# Patient Record
Sex: FEMALE | Race: WHITE | NOT HISPANIC OR LATINO | Employment: FULL TIME | URBAN - METROPOLITAN AREA
[De-identification: names, ages, dates, MRNs, and addresses within clinical notes are randomized per-mention and may not be internally consistent; named-entity substitution may affect disease eponyms.]

---

## 2018-11-29 ENCOUNTER — OFFICE VISIT (OUTPATIENT)
Dept: FAMILY MEDICINE CLINIC | Facility: CLINIC | Age: 38
End: 2018-11-29

## 2018-11-29 VITALS
TEMPERATURE: 98.1 F | HEART RATE: 84 BPM | WEIGHT: 144 LBS | HEIGHT: 65 IN | DIASTOLIC BLOOD PRESSURE: 72 MMHG | BODY MASS INDEX: 23.99 KG/M2 | SYSTOLIC BLOOD PRESSURE: 120 MMHG

## 2018-11-29 DIAGNOSIS — J01.00 ACUTE NON-RECURRENT MAXILLARY SINUSITIS: Primary | ICD-10-CM

## 2018-11-29 PROCEDURE — 99213 OFFICE O/P EST LOW 20 MIN: CPT | Performed by: NURSE PRACTITIONER

## 2018-11-29 RX ORDER — METHYLPREDNISOLONE 4 MG/1
TABLET ORAL
Qty: 21 TABLET | Refills: 0 | Status: SHIPPED | OUTPATIENT
Start: 2018-11-29 | End: 2019-11-27

## 2018-11-29 RX ORDER — AMOXICILLIN 875 MG/1
875 TABLET, COATED ORAL 2 TIMES DAILY
Qty: 14 TABLET | Refills: 0 | Status: SHIPPED | OUTPATIENT
Start: 2018-11-29 | End: 2019-11-27

## 2018-11-29 NOTE — PATIENT INSTRUCTIONS

## 2018-11-29 NOTE — PROGRESS NOTES
Darren Barlow is a 38 y.o. female.     History of Present Illness   Sinus congestion and nasal congestion, HA, runny and stuffy nose for the past 2 weeks  Using Afrin NS to get nasal passages open, no fever or chills,   + hx of sinus infections  Yellow nasal secretions  Using saline nasal spray    The following portions of the patient's history were reviewed and updated as appropriate: allergies, current medications, past family history, past medical history, past social history, past surgical history and problem list.    Review of Systems   Constitutional: Negative for activity change, appetite change, chills, fatigue and fever.   HENT: Positive for congestion, postnasal drip, rhinorrhea, sinus pressure and sore throat. Negative for ear pain, sneezing, trouble swallowing and voice change.    Eyes: Negative for redness and itching.   Respiratory: Positive for cough. Negative for chest tightness, shortness of breath and wheezing.    Cardiovascular: Negative.    Gastrointestinal: Negative.  Negative for abdominal pain, nausea and vomiting.   Endocrine: Negative.    Genitourinary: Negative.    Musculoskeletal: Negative.  Negative for arthralgias and myalgias.   Skin: Negative.  Negative for rash.   Allergic/Immunologic: Positive for environmental allergies.   Neurological: Positive for headache. Negative for dizziness, weakness and light-headedness.   Hematological: Negative for adenopathy.   Psychiatric/Behavioral: Negative.  Negative for sleep disturbance.       Objective   Physical Exam   Constitutional: She is oriented to person, place, and time. She appears well-developed and well-nourished. No distress.   HENT:   Head: Normocephalic.   Right Ear: Tympanic membrane, external ear and ear canal normal.   Left Ear: Tympanic membrane, external ear and ear canal normal.   Nose: Mucosal edema, rhinorrhea and sinus tenderness present. Right sinus exhibits maxillary sinus tenderness and frontal sinus tenderness.  Left sinus exhibits maxillary sinus tenderness and frontal sinus tenderness.   Mouth/Throat: Uvula is midline, oropharynx is clear and moist and mucous membranes are normal. No oropharyngeal exudate, posterior oropharyngeal edema, posterior oropharyngeal erythema or tonsillar abscesses.   Eyes: Conjunctivae are normal.   Neck: Neck supple.   Cardiovascular: Normal rate, regular rhythm, S1 normal, S2 normal and normal heart sounds.   Pulmonary/Chest: Effort normal and breath sounds normal. No respiratory distress. She has no decreased breath sounds. She has no wheezes. She has no rhonchi. She has no rales.   Lymphadenopathy:        Head (right side): No tonsillar, no preauricular, no posterior auricular and no occipital adenopathy present.        Head (left side): No tonsillar, no preauricular, no posterior auricular and no occipital adenopathy present.     She has no cervical adenopathy.   Neurological: She is alert and oriented to person, place, and time.   Skin: Skin is warm and dry. She is not diaphoretic.   Psychiatric: She has a normal mood and affect. Her behavior is normal. Judgment and thought content normal.   Nursing note and vitals reviewed.        Assessment/Plan   Hoa was seen today for sinus congestion / pressure.    Diagnoses and all orders for this visit:    Acute non-recurrent maxillary sinusitis  -     MethylPREDNISolone (MEDROL, LUCIANO,) 4 MG tablet; Take as directed on package instructions.  -     amoxicillin (AMOXIL) 875 MG tablet; Take 1 tablet by mouth 2 (Two) Times a Day.    TAke meds as directed   F/U as needed

## 2019-11-27 ENCOUNTER — OFFICE VISIT (OUTPATIENT)
Dept: FAMILY MEDICINE CLINIC | Facility: CLINIC | Age: 39
End: 2019-11-27

## 2019-11-27 VITALS
HEART RATE: 88 BPM | BODY MASS INDEX: 25.46 KG/M2 | WEIGHT: 153 LBS | RESPIRATION RATE: 16 BRPM | DIASTOLIC BLOOD PRESSURE: 76 MMHG | SYSTOLIC BLOOD PRESSURE: 120 MMHG | TEMPERATURE: 97.3 F

## 2019-11-27 DIAGNOSIS — J01.00 ACUTE NON-RECURRENT MAXILLARY SINUSITIS: Primary | ICD-10-CM

## 2019-11-27 PROCEDURE — 99213 OFFICE O/P EST LOW 20 MIN: CPT | Performed by: FAMILY MEDICINE

## 2019-11-27 RX ORDER — AMOXICILLIN 500 MG/1
1000 CAPSULE ORAL 2 TIMES DAILY
Qty: 40 CAPSULE | Refills: 0 | Status: SHIPPED | OUTPATIENT
Start: 2019-11-27 | End: 2020-10-31

## 2019-11-27 RX ORDER — PREDNISONE 20 MG/1
40 TABLET ORAL DAILY
Qty: 10 TABLET | Refills: 0 | Status: SHIPPED | OUTPATIENT
Start: 2019-11-27 | End: 2020-10-31

## 2019-11-27 NOTE — PROGRESS NOTES
Subjective   Hoa Barlow is a 39 y.o. female.     History of Present Illness     Her allergies have been worse and then the last 2 weeks she has been feeling worse  Sinus pressure  Left ear [pain  Teeth sore on the left as well  Severe nasal congestion        Review of Systems   Constitutional: Negative for fever.   HENT: Positive for congestion and sinus pressure.        Objective   Physical Exam   Constitutional: She appears well-developed and well-nourished. No distress.   HENT:   Head: Normocephalic and atraumatic.   Right Ear: Hearing, tympanic membrane, external ear and ear canal normal.   Left Ear: Hearing, tympanic membrane, external ear and ear canal normal.   Nose: Right sinus exhibits maxillary sinus tenderness. Left sinus exhibits maxillary sinus tenderness and frontal sinus tenderness.   Mouth/Throat: Uvula is midline, oropharynx is clear and moist and mucous membranes are normal.   Eyes: Conjunctivae and EOM are normal.   Neck: Normal range of motion.   Cardiovascular: Normal rate, regular rhythm and normal heart sounds.   Pulmonary/Chest: Effort normal and breath sounds normal.   Lymphadenopathy:     She has no cervical adenopathy.   Psychiatric: She has a normal mood and affect. Her behavior is normal.   Nursing note and vitals reviewed.      Assessment/Plan   Hoa was seen today for sinus problem.    Diagnoses and all orders for this visit:    Acute non-recurrent maxillary sinusitis  -     predniSONE (DELTASONE) 20 MG tablet; Take 2 tablets by mouth Daily.  -     amoxicillin (AMOXIL) 500 MG capsule; Take 2 capsules by mouth 2 (Two) Times a Day.  -     Chlorcyclizine-Pseudoephed 25-60 MG tablet; 1/2-1 po q 8 hours PRN    stahist, pred 5 days and amox.  Call back INB

## 2020-10-31 ENCOUNTER — TELEMEDICINE (OUTPATIENT)
Dept: FAMILY MEDICINE CLINIC | Facility: TELEHEALTH | Age: 40
End: 2020-10-31

## 2020-10-31 ENCOUNTER — E-VISIT (OUTPATIENT)
Dept: FAMILY MEDICINE CLINIC | Facility: TELEHEALTH | Age: 40
End: 2020-10-31

## 2020-10-31 DIAGNOSIS — H66.001 ACUTE SUPPURATIVE OTITIS MEDIA OF RIGHT EAR WITHOUT SPONTANEOUS RUPTURE OF TYMPANIC MEMBRANE, RECURRENCE NOT SPECIFIED: ICD-10-CM

## 2020-10-31 DIAGNOSIS — J06.9 UPPER RESPIRATORY TRACT INFECTION, UNSPECIFIED TYPE: Primary | ICD-10-CM

## 2020-10-31 PROCEDURE — 99213 OFFICE O/P EST LOW 20 MIN: CPT | Performed by: NURSE PRACTITIONER

## 2020-10-31 RX ORDER — AZELASTINE HCL 205.5 UG/1
SPRAY NASAL
COMMUNITY
Start: 2015-12-16 | End: 2022-04-22

## 2020-10-31 RX ORDER — AMOXICILLIN 875 MG/1
875 TABLET, COATED ORAL 2 TIMES DAILY
Qty: 20 TABLET | Refills: 0 | Status: SHIPPED | OUTPATIENT
Start: 2020-10-31 | End: 2020-11-10

## 2020-10-31 RX ORDER — FLUTICASONE PROPIONATE 50 MCG
2 SPRAY, SUSPENSION (ML) NASAL DAILY
Qty: 1 BOTTLE | Refills: 0 | Status: SHIPPED | OUTPATIENT
Start: 2020-10-31

## 2020-10-31 RX ORDER — PREDNISONE 10 MG/1
TABLET ORAL
Qty: 21 TABLET | Refills: 0 | Status: SHIPPED | OUTPATIENT
Start: 2020-10-31 | End: 2022-04-22

## 2020-10-31 NOTE — PROGRESS NOTES
CHIEF COMPLAINT  Chief Complaint   Patient presents with   • Nasal Congestion   • post nasal drip         HPI  Hoa Barlow is a 40 y.o. female  presents with complaint of 1 week history of PND, right-sided nasal congestion, right ear pain, clearing throat often, occasional cough from PND.  She typically gets ear infections this time of year.  Currently taking Astelin nasal spray for allergies and benadryl with little symptom relief    Review of Systems   Constitutional: Negative for activity change, appetite change, fatigue and fever.   HENT: Positive for congestion, ear pain and postnasal drip. Negative for sinus pressure, sinus pain and sore throat.    Respiratory: Positive for cough. Negative for chest tightness, shortness of breath and wheezing.    Neurological: Negative for dizziness and headaches.   All other systems reviewed and are negative.      No past medical history on file.    Family History   Problem Relation Age of Onset   • No Known Problems Mother    • No Known Problems Father    • Hypertension Maternal Grandmother    • Hypertension Maternal Grandfather    • Hypertension Paternal Grandmother    • Hypertension Paternal Grandfather        Social History     Socioeconomic History   • Marital status:      Spouse name: Not on file   • Number of children: Not on file   • Years of education: Not on file   • Highest education level: Not on file   Tobacco Use   • Smoking status: Never Smoker   • Smokeless tobacco: Never Used   Substance and Sexual Activity   • Alcohol use: No     Frequency: Never   • Drug use: No   • Sexual activity: Yes     Partners: Male     Comment:          There were no vitals taken for this visit.    PHYSICAL EXAM  Physical Exam   Constitutional: She is oriented to person, place, and time. She appears well-developed and well-nourished. She does not have a sickly appearance. She does not appear ill.   HENT:   Head: Normocephalic and atraumatic.   Directed exam--tenderness  of post-auricular area; no frontal or maxillary sinus tenderness   Pulmonary/Chest: Effort normal.  No respiratory distress (occasional cough and clearing throat during visit).  Lymphadenopathy:   Directed exam--mild tenderness of right anterior cervical region   Neurological: She is alert and oriented to person, place, and time.       No results found for this or any previous visit.    Diagnoses and all orders for this visit:    1. Upper respiratory tract infection, unspecified type (Primary)  -     predniSONE (DELTASONE) 10 MG (21) dose pack; Use as directed on package  Dispense: 21 tablet; Refill: 0  -     fluticasone (FLONASE) 50 MCG/ACT nasal spray; 2 sprays into the nostril(s) as directed by provider Daily.  Dispense: 1 bottle; Refill: 0  --complete Deltasone pack as prescribed for inflammation  --start Flonase daily  --continue Astelin and benadryl  --increase intake of clear, decaffeinated fluids to help thin secretions and maintain hydration    2. Acute suppurative otitis media of right ear without spontaneous rupture of tympanic membrane, recurrence not specified  -     amoxicillin (AMOXIL) 875 MG tablet; Take 1 tablet by mouth 2 (Two) Times a Day for 10 days.  Dispense: 20 tablet; Refill: 0  -     fluticasone (FLONASE) 50 MCG/ACT nasal spray; 2 sprays into the nostril(s) as directed by provider Daily.  Dispense: 1 bottle; Refill: 0  --complete Amoxicillin as prescribed for infection  --start Flonase and continue Astelin   --continue benadryl  --ibuprofen for pain and inflammation        **if at any time experiences fever AND/OR worsening cough AND/OR shortness of breath AND/OR loss of sense of taste or smell, has been advised to go to nearest urgent care or emergency department for evaluation AND/OR testing    **if no improvement in 5-7 days, but not worsening, may schedule a f/u virtual visit or E-visit      FOLLOW-UP  As discussed during visit with PCP/Virtual Care if no improvement or Urgent  Care/Emergency Department if worsening of symptoms    Patient verbalizes understanding of medication dosage, comfort measures, instructions for treatment and follow-up.    MARCELL Saunders  10/31/2020  10:56 EDT    This visit was performed via Telehealth.  This patient has been instructed to follow-up with their primary care provider if their symptoms worsen or the treatment provided does not resolve their illness.

## 2020-10-31 NOTE — TELEPHONE ENCOUNTER
This encounter was created in error - please disregard.   · Consult Ophthalmology-- OP management recommended

## 2020-10-31 NOTE — PATIENT INSTRUCTIONS
Otitis Media, Adult    Otitis media occurs when there is inflammation and fluid in the middle ear. Your middle ear is a part of the ear that contains bones for hearing as well as air that helps send sounds to your brain.  What are the causes?  This condition is caused by a blockage in the eustachian tube. This tube drains fluid from the ear to the back of the nose (nasopharynx). A blockage in this tube can be caused by an object or by swelling (edema) in the tube. Problems that can cause a blockage include:  · A cold or other upper respiratory infection.  · Allergies.  · An irritant, such as tobacco smoke.  · Enlarged adenoids. The adenoids are areas of soft tissue located high in the back of the throat, behind the nose and the roof of the mouth.  · A mass in the nasopharynx.  · Damage to the ear caused by pressure changes (barotrauma).  What are the signs or symptoms?  Symptoms of this condition include:  · Ear pain.  · A fever.  · Decreased hearing.  · A headache.  · Tiredness (lethargy).  · Fluid leaking from the ear.  · Ringing in the ear.  How is this diagnosed?  This condition is diagnosed with a physical exam. During the exam your health care provider will use an instrument called an otoscope to look into your ear and check for redness, swelling, and fluid. He or she will also ask about your symptoms.  Your health care provider may also order tests, such as:  · A test to check the movement of the eardrum (pneumatic otoscopy). This test is done by squeezing a small amount of air into the ear.  · A test that changes air pressure in the middle ear to check how well the eardrum moves and whether the eustachian tube is working (tympanogram).  How is this treated?  This condition usually goes away on its own within 3-5 days. But if the condition is caused by a bacteria infection and does not go away own its own, or keeps coming back, your health care provider may:  · Prescribe antibiotic medicines to treat the  infection.  · Prescribe or recommend medicines to control pain.  Follow these instructions at home:  · Take over-the-counter and prescription medicines only as told by your health care provider.  · If you were prescribed an antibiotic medicine, take it as told by your health care provider. Do not stop taking the antibiotic even if you start to feel better.  · Keep all follow-up visits as told by your health care provider. This is important.  Contact a health care provider if:  · You have bleeding from your nose.  · There is a lump on your neck.  · You are not getting better in 5 days.  · You feel worse instead of better.  Get help right away if:  · You have severe pain that is not controlled with medicine.  · You have swelling, redness, or pain around your ear.  · You have stiffness in your neck.  · A part of your face is paralyzed.  · The bone behind your ear (mastoid) is tender when you touch it.  · You develop a severe headache.  Summary  · Otitis media is redness, soreness, and swelling of the middle ear.  · This condition usually goes away on its own within 3-5 days.  · If the problem does not go away in 3-5 days, your health care provider may prescribe or recommend medicines to treat your symptoms.  · If you were prescribed an antibiotic medicine, take it as told by your health care provider.  This information is not intended to replace advice given to you by your health care provider. Make sure you discuss any questions you have with your health care provider.  Document Released: 09/22/2005 Document Revised: 11/30/2018 Document Reviewed: 12/08/2017  [employer] Patient Education © 2020 [employer]    Upper Respiratory Infection, Adult  An upper respiratory infection (URI) is a common viral infection of the nose, throat, and upper air passages that lead to the lungs. The most common type of URI is the common cold. URIs usually get better on their own, without medical treatment.  What are the causes?  A URI is  caused by a virus. You may catch a virus by:  · Breathing in droplets from an infected person's cough or sneeze.  · Touching something that has been exposed to the virus (contaminated) and then touching your mouth, nose, or eyes.  What increases the risk?  You are more likely to get a URI if:  · You are very young or very old.  · It is ravi or winter.  · You have close contact with others, such as at a , school, or health care facility.  · You smoke.  · You have long-term (chronic) heart or lung disease.  · You have a weakened disease-fighting (immune) system.  · You have nasal allergies or asthma.  · You are experiencing a lot of stress.  · You work in an area that has poor air circulation.  · You have poor nutrition.  What are the signs or symptoms?  A URI usually involves some of the following symptoms:  · Runny or stuffy (congested) nose.  · Sneezing.  · Cough.  · Sore throat.  · Headache.  · Fatigue.  · Fever.  · Loss of appetite.  · Pain in your forehead, behind your eyes, and over your cheekbones (sinus pain).  · Muscle aches.  · Redness or irritation of the eyes.  · Pressure in the ears or face.  How is this diagnosed?  This condition may be diagnosed based on your medical history and symptoms, and a physical exam. Your health care provider may use a cotton swab to take a mucus sample from your nose (nasal swab). This sample can be tested to determine what virus is causing the illness.  How is this treated?  URIs usually get better on their own within 7-10 days. You can take steps at home to relieve your symptoms. Medicines cannot cure URIs, but your health care provider may recommend certain medicines to help relieve symptoms, such as:  · Over-the-counter cold medicines.  · Cough suppressants. Coughing is a type of defense against infection that helps to clear the respiratory system, so take these medicines only as recommended by your health care provider.  · Fever-reducing medicines.  Follow these  instructions at home:  Activity  · Rest as needed.  · If you have a fever, stay home from work or school until your fever is gone or until your health care provider says you are no longer contagious. Your health care provider may have you wear a face mask to prevent your infection from spreading.  Relieving symptoms  · Gargle with a salt-water mixture 3-4 times a day or as needed. To make a salt-water mixture, completely dissolve ½-1 tsp of salt in 1 cup of warm water.  · Use a cool-mist humidifier to add moisture to the air. This can help you breathe more easily.  Eating and drinking    · Drink enough fluid to keep your urine pale yellow.  · Eat soups and other clear broths.  General instructions    · Take over-the-counter and prescription medicines only as told by your health care provider. These include cold medicines, fever reducers, and cough suppressants.  · Do not use any products that contain nicotine or tobacco, such as cigarettes and e-cigarettes. If you need help quitting, ask your health care provider.  · Stay away from secondhand smoke.  · Stay up to date on all immunizations, including the yearly (annual) flu vaccine.  · Keep all follow-up visits as told by your health care provider. This is important.  How to prevent the spread of infection to others    · URIs can be passed from person to person (are contagious). To prevent the infection from spreading:  ? Wash your hands often with soap and water. If soap and water are not available, use hand .  ? Avoid touching your mouth, face, eyes, or nose.  ? Cough or sneeze into a tissue or your sleeve or elbow instead of into your hand or into the air.  Contact a health care provider if:  · You are getting worse instead of better.  · You have a fever or chills.  · Your mucus is brown or red.  · You have yellow or brown discharge coming from your nose.  · You have pain in your face, especially when you bend forward.  · You have swollen neck  glands.  · You have pain while swallowing.  · You have white areas in the back of your throat.  Get help right away if:  · You have shortness of breath that gets worse.  · You have severe or persistent:  ? Headache.  ? Ear pain.  ? Sinus pain.  ? Chest pain.  · You have chronic lung disease along with any of the following:  ? Wheezing.  ? Prolonged cough.  ? Coughing up blood.  ? A change in your usual mucus.  · You have a stiff neck.  · You have changes in your:  ? Vision.  ? Hearing.  ? Thinking.  ? Mood.  Summary  · An upper respiratory infection (URI) is a common infection of the nose, throat, and upper air passages that lead to the lungs.  · A URI is caused by a virus.  · URIs usually get better on their own within 7-10 days.  · Medicines cannot cure URIs, but your health care provider may recommend certain medicines to help relieve symptoms.  This information is not intended to replace advice given to you by your health care provider. Make sure you discuss any questions you have with your health care provider.  Document Released: 06/13/2002 Document Revised: 12/26/2019 Document Reviewed: 08/03/2018  [employer] Patient Education © 2020 [employer]

## 2022-04-22 ENCOUNTER — TELEPHONE (OUTPATIENT)
Dept: FAMILY MEDICINE CLINIC | Facility: CLINIC | Age: 42
End: 2022-04-22

## 2022-04-22 ENCOUNTER — OFFICE VISIT (OUTPATIENT)
Dept: FAMILY MEDICINE CLINIC | Facility: CLINIC | Age: 42
End: 2022-04-22

## 2022-04-22 VITALS
WEIGHT: 146 LBS | HEIGHT: 65 IN | BODY MASS INDEX: 24.32 KG/M2 | HEART RATE: 88 BPM | RESPIRATION RATE: 16 BRPM | TEMPERATURE: 97.4 F | SYSTOLIC BLOOD PRESSURE: 106 MMHG | DIASTOLIC BLOOD PRESSURE: 74 MMHG | OXYGEN SATURATION: 99 %

## 2022-04-22 DIAGNOSIS — R45.4 IRRITABILITY: ICD-10-CM

## 2022-04-22 DIAGNOSIS — R35.0 URINARY FREQUENCY: Primary | ICD-10-CM

## 2022-04-22 DIAGNOSIS — F41.9 ANXIETY: ICD-10-CM

## 2022-04-22 DIAGNOSIS — Z91.09 ENVIRONMENTAL ALLERGIES: ICD-10-CM

## 2022-04-22 DIAGNOSIS — R39.89 SUSPECTED UTI: ICD-10-CM

## 2022-04-22 LAB
BILIRUB BLD-MCNC: NEGATIVE MG/DL
CLARITY, POC: CLEAR
COLOR UR: YELLOW
GLUCOSE UR STRIP-MCNC: NEGATIVE MG/DL
KETONES UR QL: NEGATIVE
LEUKOCYTE EST, POC: NEGATIVE
NITRITE UR-MCNC: NEGATIVE MG/ML
PH UR: 7 [PH] (ref 5–8)
PROT UR STRIP-MCNC: NEGATIVE MG/DL
RBC # UR STRIP: ABNORMAL /UL
SP GR UR: 1.01 (ref 1–1.03)
UROBILINOGEN UR QL: NORMAL

## 2022-04-22 PROCEDURE — 81002 URINALYSIS NONAUTO W/O SCOPE: CPT | Performed by: FAMILY MEDICINE

## 2022-04-22 PROCEDURE — 99214 OFFICE O/P EST MOD 30 MIN: CPT | Performed by: FAMILY MEDICINE

## 2022-04-22 RX ORDER — CETIRIZINE HYDROCHLORIDE 10 MG/1
10 TABLET ORAL DAILY
COMMUNITY
End: 2022-04-22 | Stop reason: ALTCHOICE

## 2022-04-22 RX ORDER — LEVOCETIRIZINE DIHYDROCHLORIDE 5 MG/1
5 TABLET, FILM COATED ORAL EVERY EVENING
Qty: 90 TABLET | Refills: 1 | Status: SHIPPED | OUTPATIENT
Start: 2022-04-22

## 2022-04-22 RX ORDER — CIPROFLOXACIN 500 MG/1
500 TABLET, FILM COATED ORAL 2 TIMES DAILY
Qty: 14 TABLET | Refills: 0 | Status: SHIPPED | OUTPATIENT
Start: 2022-04-22

## 2022-04-22 RX ORDER — ESCITALOPRAM OXALATE 5 MG/1
5 TABLET ORAL DAILY
Qty: 90 TABLET | Refills: 0 | Status: SHIPPED | OUTPATIENT
Start: 2022-04-22 | End: 2022-07-19

## 2022-04-22 NOTE — TELEPHONE ENCOUNTER
uti symptoms wanting to call and see if something can be called in or if she can leave a urine sample she didn't want to go a whole weekend with a UTI she was almost ceratin that's what it was

## 2022-04-22 NOTE — PROGRESS NOTES
"Chief Complaint  Urinary Frequency (X yesterday )    Subjective          Hoa Barlow presents to [facility]  Urinary Frequency   This is a new problem. The current episode started yesterday. The quality of the pain is described as aching. There has been no fever. Associated symptoms include frequency and urgency. Pertinent negatives include no flank pain. She has tried increased fluids for the symptoms. The treatment provided no relief.     She is experiencing allergies  Sneezing, watery eyes, itchy eyes  Only treating with flonase NS    Finds herself getting irritated easily, especially at work and dealing with customers  Stresses easily   Would like to start medication to improve anxiety.  Denies symptoms of depression  Her children take Lexapro, so familiar with that medication     The following portions of the patient's history were reviewed and updated as appropriate: allergies, current medications, past family history, past medical history, past social history, past surgical history and problem list.    Objective   Vital Signs:   /74   Pulse 88   Temp 97.4 °F (36.3 °C)   Resp 16   Ht 165.1 cm (65\")   Wt 66.2 kg (146 lb)   SpO2 99%   BMI 24.30 kg/m²     Physical Exam  Vitals and nursing note reviewed.   Constitutional:       Appearance: Normal appearance. She is well-developed.   HENT:      Head: Normocephalic and atraumatic.      Right Ear: External ear normal.      Left Ear: External ear normal.   Eyes:      Conjunctiva/sclera: Conjunctivae normal.   Pulmonary:      Effort: Pulmonary effort is normal. No respiratory distress.   Abdominal:      Palpations: Abdomen is soft.      Tenderness: There is no abdominal tenderness. There is no guarding.   Musculoskeletal:         General: No deformity.   Skin:     General: Skin is warm.   Neurological:      Mental Status: She is alert and oriented to person, place, and time.   Psychiatric:         Mood and Affect: Mood " normal.         Behavior: Behavior normal.         Thought Content: Thought content normal.        Result Review :                 Assessment and Plan    Diagnoses and all orders for this visit:    1. Urinary frequency (Primary)  -     POC Urinalysis Dipstick  -     Urine Culture - , Urine, Clean Catch  -     ciprofloxacin (Cipro) 500 MG tablet; Take 1 tablet by mouth 2 (Two) Times a Day.  Dispense: 14 tablet; Refill: 0    2. Suspected UTI  -     Urine Culture - , Urine, Clean Catch  -     ciprofloxacin (Cipro) 500 MG tablet; Take 1 tablet by mouth 2 (Two) Times a Day.  Dispense: 14 tablet; Refill: 0    3. Environmental allergies  -     levocetirizine (Xyzal) 5 MG tablet; Take 1 tablet by mouth Every Evening.  Dispense: 90 tablet; Refill: 1    4. Anxiety  -     escitalopram (Lexapro) 5 MG tablet; Take 1 tablet by mouth Daily.  Dispense: 90 tablet; Refill: 0    5. Irritability  -     escitalopram (Lexapro) 5 MG tablet; Take 1 tablet by mouth Daily.  Dispense: 90 tablet; Refill: 0    UA +blood, will culture. Start treatment for UTI with Cipro  Start lexapro to improve anxiety and irritability   Add Xyzal to treatment to improve allergies    Follow Up   Return in about 6 weeks (around 6/3/2022) for Recheck with Dr. Garrett or myself .  Patient was given instructions and counseling regarding her condition or for health maintenance advice. Please see specific information pulled into the AVS if appropriate.

## 2022-04-22 NOTE — TELEPHONE ENCOUNTER
She was last seen in 2019 here, almost 3 years ago.   I have a couple appts available that she can be seen today. If not, go to [facility].

## 2022-04-24 LAB
BACTERIA UR CULT: NORMAL
BACTERIA UR CULT: NORMAL

## 2022-07-18 DIAGNOSIS — R45.4 IRRITABILITY: ICD-10-CM

## 2022-07-18 DIAGNOSIS — F41.9 ANXIETY: ICD-10-CM

## 2022-07-19 RX ORDER — ESCITALOPRAM OXALATE 5 MG/1
TABLET ORAL
Qty: 90 TABLET | Refills: 0 | Status: SHIPPED | OUTPATIENT
Start: 2022-07-19